# Patient Record
Sex: MALE | Race: BLACK OR AFRICAN AMERICAN | Employment: UNEMPLOYED | URBAN - METROPOLITAN AREA
[De-identification: names, ages, dates, MRNs, and addresses within clinical notes are randomized per-mention and may not be internally consistent; named-entity substitution may affect disease eponyms.]

---

## 2021-06-09 ENCOUNTER — HOSPITAL ENCOUNTER (EMERGENCY)
Age: 21
Discharge: HOME | End: 2021-06-09
Attending: EMERGENCY MEDICINE | Admitting: EMERGENCY MEDICINE
Payer: COMMERCIAL

## 2021-06-09 VITALS
DIASTOLIC BLOOD PRESSURE: 90 MMHG | WEIGHT: 240 LBS | HEART RATE: 98 BPM | SYSTOLIC BLOOD PRESSURE: 149 MMHG | RESPIRATION RATE: 20 BRPM | BODY MASS INDEX: 32.51 KG/M2 | OXYGEN SATURATION: 98 % | TEMPERATURE: 98.6 F | HEIGHT: 72 IN
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
## 2021-06-09 DIAGNOSIS — S39.012A BACK STRAIN, INITIAL ENCOUNTER: Primary | ICD-10-CM

## 2021-06-09 PROCEDURE — 99282 EMERGENCY DEPT VISIT SF MDM: CPT

## 2021-06-09 RX ORDER — METHOCARBAMOL 750 MG/1
750 TABLET, FILM COATED ORAL 4 TIMES DAILY
Qty: 30 TABLET | Refills: 0 | Status: SHIPPED | OUTPATIENT
Start: 2021-06-09 | End: 2021-06-17

## 2021-06-10 NOTE — DISCHARGE INSTRUCTIONS
Follow up with one of the doctors listed. Return to the ER if your symptoms worsen.     [address] [facility] [address]  [phone]    [address] [place] [address] [id number] [phone]
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
## 2021-06-14 ENCOUNTER — APPOINTMENT (OUTPATIENT)
Dept: GENERAL RADIOLOGY | Age: 21
End: 2021-06-14
Attending: EMERGENCY MEDICINE
Payer: COMMERCIAL

## 2021-06-14 ENCOUNTER — HOSPITAL ENCOUNTER (EMERGENCY)
Age: 21
Discharge: HOME | End: 2021-06-14
Attending: EMERGENCY MEDICINE
Payer: COMMERCIAL

## 2021-06-14 VITALS
BODY MASS INDEX: 32.51 KG/M2 | DIASTOLIC BLOOD PRESSURE: 84 MMHG | TEMPERATURE: 99.1 F | HEIGHT: 72 IN | OXYGEN SATURATION: 98 % | WEIGHT: 240 LBS | HEART RATE: 90 BPM | SYSTOLIC BLOOD PRESSURE: 145 MMHG | RESPIRATION RATE: 20 BRPM

## 2021-06-14 DIAGNOSIS — S29.019A THORACIC MYOFASCIAL STRAIN, INITIAL ENCOUNTER: ICD-10-CM
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
## 2021-06-14 DIAGNOSIS — M62.838 MUSCLE SPASM: ICD-10-CM

## 2021-06-14 PROCEDURE — 72070 X-RAY EXAM THORAC SPINE 2VWS: CPT

## 2021-06-14 PROCEDURE — 99283 EMERGENCY DEPT VISIT LOW MDM: CPT

## 2021-06-14 RX ORDER — KETOROLAC TROMETHAMINE 10 MG/1
10 TABLET, FILM COATED ORAL
Qty: 30 TABLET | Refills: 0 | Status: SHIPPED | OUTPATIENT
Start: 2021-06-14
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
## 2021-06-15 NOTE — ED NOTES
I have reviewed discharge instructions with the patient. The patient verbalized understanding. Patient left ED via Discharge Method: ambulatory to Home with self. Opportunity for questions and clarification provided. Patient given 1 scripts. Pt in no acute distress at discharge. Medication explained to pt and pt v\u about medication. To continue your aftercare when you leave the hospital, you may receive an automated call from our care team to check in on how you are doing. This is a free service and part of our promise to provide the best care and service to meet your aftercare needs.  If you have questions, or wish to unsubscribe from this service please call [phone]. Thank you for Choosing our [facility] Emergency Department.

## 2021-11-06 ENCOUNTER — HOSPITAL ENCOUNTER (EMERGENCY)
Age: 21
Discharge: HOME | End: 2021-11-06
Attending: EMERGENCY MEDICINE
Payer: COMMERCIAL

## 2021-11-06 VITALS
RESPIRATION RATE: 20 BRPM | BODY MASS INDEX: 45.1 KG/M2 | WEIGHT: 315 LBS | HEIGHT: 70 IN | OXYGEN SATURATION: 94 % | HEART RATE: 105 BPM | DIASTOLIC BLOOD PRESSURE: 79 MMHG | SYSTOLIC BLOOD PRESSURE: 150 MMHG | TEMPERATURE: 98 F

## 2021-11-06 DIAGNOSIS — T78.2XXA ANAPHYLAXIS, INITIAL ENCOUNTER: Primary | ICD-10-CM

## 2021-11-06 DIAGNOSIS — Z91.018 FOOD ALLERGY: ICD-10-CM

## 2021-11-06 PROCEDURE — 96374 THER/PROPH/DIAG INJ IV PUSH: CPT

## 2021-11-06 PROCEDURE — 74011000250 HC RX REV CODE- 250: Performed by: EMERGENCY MEDICINE

## 2021-11-06 PROCEDURE — 96361 HYDRATE IV INFUSION ADD-ON: CPT

## 2021-11-06 PROCEDURE — 99284 EMERGENCY DEPT VISIT MOD MDM: CPT

## 2021-11-06 PROCEDURE — 96372 THER/PROPH/DIAG INJ SC/IM: CPT

## 2021-11-06 PROCEDURE — 96375 TX/PRO/DX INJ NEW DRUG ADDON: CPT

## 2021-11-06 PROCEDURE — 94640 AIRWAY INHALATION TREATMENT: CPT

## 2021-11-06 PROCEDURE — 74011250636 HC RX REV CODE- 250/636: Performed by: EMERGENCY MEDICINE

## 2021-11-06 RX ORDER — ALBUTEROL SULFATE 0.83 MG/ML
5 SOLUTION RESPIRATORY (INHALATION)
Status: COMPLETED | OUTPATIENT
Start: 2021-11-06 | End: 2021-11-06

## 2021-11-06 RX ORDER — DIPHENHYDRAMINE HYDROCHLORIDE 50 MG/ML
25 INJECTION, SOLUTION INTRAMUSCULAR; INTRAVENOUS
Status: COMPLETED | OUTPATIENT
Start: 2021-11-06 | End: 2021-11-06

## 2021-11-06 RX ORDER — EPINEPHRINE 0.3 MG/.3ML
0.3 INJECTION SUBCUTANEOUS
Qty: 1 EACH | Refills: 1 | Status: SHIPPED | OUTPATIENT
Start: 2021-11-06 | End: 2021-11-06

## 2021-11-06 RX ORDER — PREDNISONE 20 MG/1
40 TABLET ORAL DAILY
Qty: 10 TABLET | Refills: 0 | Status: SHIPPED | OUTPATIENT
Start: 2021-11-06 | End: 2021-11-11

## 2021-11-06 RX ORDER — EPINEPHRINE 1 MG/ML
0.3 INJECTION, SOLUTION, CONCENTRATE INTRAVENOUS ONCE
Status: COMPLETED | OUTPATIENT
Start: 2021-11-06 | End: 2021-11-06

## 2021-11-06 RX ORDER — FAMOTIDINE 20 MG/1
20 TABLET, FILM COATED ORAL 2 TIMES DAILY
Qty: 10 TABLET | Refills: 0 | Status: SHIPPED | OUTPATIENT
Start: 2021-11-06 | End: 2021-11-11

## 2021-11-06 RX ADMIN — SODIUM CHLORIDE 1000 ML: 900 INJECTION, SOLUTION INTRAVENOUS at 19:02

## 2021-11-06 RX ADMIN — DIPHENHYDRAMINE HYDROCHLORIDE 25 MG: 50 INJECTION, SOLUTION INTRAMUSCULAR; INTRAVENOUS at 19:02

## 2021-11-06 RX ADMIN — EPINEPHRINE 0.3 MG: 1 INJECTION, SOLUTION, CONCENTRATE INTRAVENOUS at 19:03

## 2021-11-06 RX ADMIN — FAMOTIDINE 20 MG: 10 INJECTION, SOLUTION INTRAVENOUS at 19:05

## 2021-11-06 RX ADMIN — METHYLPREDNISOLONE SODIUM SUCCINATE 125 MG: 125 INJECTION, POWDER, FOR SOLUTION INTRAMUSCULAR; INTRAVENOUS at 19:05

## 2021-11-06 RX ADMIN — ALBUTEROL SULFATE 5 MG: 2.5 SOLUTION RESPIRATORY (INHALATION) at 19:05

## 2021-11-06 NOTE — ED PROVIDER NOTES
Patient presents the ER with concerns of allergic reaction. States about 30 minutes ago after eating a banana he noticed itching and swelling to his body. Is had increased swelling issue to his face. Does report some shortness of breath. Denies any cough. The history is provided by the patient. Allergic Reaction   This is a new problem. The current episode started less than 1 hour ago. The problem has been gradually worsening. Associated symptoms include shortness of breath. Pertinent negatives include no nausea, no vomiting and no confusion. Past Medical History:   Diagnosis Date    Asthma        No past surgical history on file. No family history on file. Social History     Socioeconomic History    Marital status: SINGLE     Spouse name: Not on file    Number of children: Not on file    Years of education: Not on file    Highest education level: Not on file   Occupational History    Not on file   Tobacco Use    Smoking status: Never Smoker    Smokeless tobacco: Never Used   Substance and Sexual Activity    Alcohol use: Not on file    Drug use: Not on file    Sexual activity: Not on file   Other Topics Concern    Not on file   Social History Narrative    Not on file     Social Determinants of Health     Financial Resource Strain:     Difficulty of Paying Living Expenses: Not on file   Food Insecurity:     Worried About Running Out of Food in the Last Year: Not on file    [place] of Food in the Last Year: Not on file   Transportation Needs:     Lack of Transportation (Medical): Not on file    Lack of Transportation (Non-Medical):  Not on file   Physical Activity:     Days of Exercise per Week: Not on file    Minutes of Exercise per Session: Not on file   Stress:     Feeling of Stress : Not on file   Social Connections:     Frequency of Communication with Friends and Family: Not on file    Frequency of Social Gatherings with Friends and Family: Not on file    Attends Cheondoism Services: Not on file    Active Member of Clubs or Organizations: Not on file    Attends Club or Organization Meetings: Not on file    Marital Status: Not on file   Intimate Partner Violence:     Fear of Current or Ex-Partner: Not on file    Emotionally Abused: Not on file    Physically Abused: Not on file    Sexually Abused: Not on file   Housing Stability:     Unable to Pay for Housing in the Last Year: Not on file    Number of [place] in the Last Year: Not on file    Unstable Housing in the Last Year: Not on file         ALLERGIES: Patient has no known allergies. Review of Systems   Constitutional: Negative for fatigue and fever. HENT: Negative for congestion, dental problem, trouble swallowing and voice change. Eyes: Negative for photophobia and redness. Respiratory: Positive for chest tightness, shortness of breath and wheezing. Negative for apnea. Cardiovascular: Negative for chest pain and leg swelling. Gastrointestinal: Negative for abdominal distention, abdominal pain, nausea and vomiting. Endocrine: Negative for polyphagia and polyuria. Genitourinary: Negative for hematuria. Musculoskeletal: Negative for back pain, gait problem, neck pain and neck stiffness. Skin: Positive for rash. Allergic/Immunologic: Negative for food allergies and immunocompromised state. Neurological: Negative for tremors and weakness. Hematological: Negative for adenopathy. Does not bruise/bleed easily. Psychiatric/Behavioral: Negative for behavioral problems and confusion. All other systems reviewed and are negative. Vitals:    11/06/21 1847   BP: 120/75   Pulse: (!) 105   Resp: 20   Temp: 98 °F (36.7 °C)   SpO2: 99%   Weight: 22.7 kg (50 lb)            Physical Exam  Vitals and nursing note reviewed. Constitutional:       General: He is not in acute distress. Appearance: Normal appearance. He is not ill-appearing. HENT:      Head: Normocephalic and atraumatic. Comments: Facial swelling noted     Right Ear: External ear normal.      Left Ear: External ear normal.      Nose: Nose normal. No congestion or rhinorrhea. Mouth/Throat:      Mouth: Mucous membranes are moist.      Pharynx: No oropharyngeal exudate or posterior oropharyngeal erythema. Eyes:      Extraocular Movements: Extraocular movements intact. Pupils: Pupils are equal, round, and reactive to light. Cardiovascular:      Rate and Rhythm: Normal rate and regular rhythm. Pulses: Normal pulses. Heart sounds: Normal heart sounds. Pulmonary:      Effort: Pulmonary effort is normal. No respiratory distress. Breath sounds: No stridor. Wheezing present. Musculoskeletal:         General: No swelling, tenderness, deformity or signs of injury. Normal range of motion. Cervical back: Normal range of motion and neck supple. Skin:     Findings: Rash present. Comments: Urticarial rash   Neurological:      Mental Status: He is alert. MDM  Number of Diagnoses or Management Options  Diagnosis management comments: Patient has wheezing, facial swelling, urticarial rash. Will treat with epinephrine, steroids, antihistamines. 7:27 PM  Symptomatically patient has improved. His swelling of his face has improved after being treated. We will continue to monitor. 9:08 PM  Patient remains symptomatically improved. Plan to discharge home. Urged outpatient follow-up with neurology testing. Amount and/or Complexity of Data Reviewed  Clinical lab tests: ordered and reviewed    Risk of Complications, Morbidity, and/or Mortality  Presenting problems: high  Diagnostic procedures: high  Management options: high    Patient Progress  Patient progress: stable         Procedures               Results Include:    No results found for this or any previous visit (from the past 24 hour(s)). Voice dictation software was used during the making of this note.   This software is not perfect and grammatical and other typographical errors may be present. This note has been proofread, but may still contain errors. Selam Diaz MD; 11/6/2021 @9:08 PM   ===================================================================    I wore appropriate PPE throughout this patient's ED visit.  Selam Diaz MD, 9:08 PM

## 2021-11-07 NOTE — ED NOTES
I have reviewed discharge instructions with the patient. The patient verbalized understanding. Patient left ED via Discharge Method: ambulatory to Home with (self). Opportunity for questions and clarification provided. Patient given 3 scripts. To continue your aftercare when you leave the hospital, you may receive an automated call from our care team to check in on how you are doing. This is a free service and part of our promise to provide the best care and service to meet your aftercare needs.  If you have questions, or wish to unsubscribe from this service please call [phone]. Thank you for Choosing our [address] [place] [address] Emergency Department.

## 2021-11-07 NOTE — DISCHARGE INSTRUCTIONS
Take medications as prescribed  Avoid eating bananas  As we discussed you likely need outpatient allergy testing  Return to the ER for any new, worsening or life-threatening symptoms

## 2023-01-06 ENCOUNTER — HOSPITAL ENCOUNTER (EMERGENCY)
Age: 23
Discharge: LEFT BEFORE DISCHARGE | End: 2023-01-06

## 2023-01-06 VITALS
OXYGEN SATURATION: 98 % | BODY MASS INDEX: 30.48 KG/M2 | SYSTOLIC BLOOD PRESSURE: 158 MMHG | WEIGHT: 230 LBS | HEART RATE: 67 BPM | HEIGHT: 73 IN | RESPIRATION RATE: 16 BRPM | DIASTOLIC BLOOD PRESSURE: 90 MMHG | TEMPERATURE: 98.6 F

## 2023-05-04 ENCOUNTER — HOSPITAL ENCOUNTER (EMERGENCY)
Age: 23
Discharge: HOME | End: 2023-05-04
Attending: EMERGENCY MEDICINE
Payer: MEDICAID

## 2023-05-04 VITALS
RESPIRATION RATE: 25 BRPM | OXYGEN SATURATION: 97 % | SYSTOLIC BLOOD PRESSURE: 144 MMHG | HEIGHT: 73 IN | DIASTOLIC BLOOD PRESSURE: 89 MMHG | HEART RATE: 90 BPM | WEIGHT: 250 LBS | BODY MASS INDEX: 33.13 KG/M2 | TEMPERATURE: 99 F

## 2023-05-04 DIAGNOSIS — T78.40XA ALLERGIC REACTION, INITIAL ENCOUNTER: Primary | ICD-10-CM

## 2023-05-04 PROCEDURE — 96372 THER/PROPH/DIAG INJ SC/IM: CPT

## 2023-05-04 PROCEDURE — 2500000003 HC RX 250 WO HCPCS

## 2023-05-04 PROCEDURE — A4216 STERILE WATER/SALINE, 10 ML: HCPCS

## 2023-05-04 PROCEDURE — 6360000002 HC RX W HCPCS

## 2023-05-04 PROCEDURE — 96374 THER/PROPH/DIAG INJ IV PUSH: CPT

## 2023-05-04 PROCEDURE — 2580000003 HC RX 258

## 2023-05-04 PROCEDURE — 99284 EMERGENCY DEPT VISIT MOD MDM: CPT

## 2023-05-04 PROCEDURE — 96375 TX/PRO/DX INJ NEW DRUG ADDON: CPT

## 2023-05-04 RX ORDER — DIPHENHYDRAMINE HYDROCHLORIDE 50 MG/ML
50 INJECTION INTRAMUSCULAR; INTRAVENOUS ONCE
Status: COMPLETED | OUTPATIENT
Start: 2023-05-04 | End: 2023-05-04

## 2023-05-04 RX ORDER — EPINEPHRINE 0.3 MG/.3ML
0.3 INJECTION SUBCUTANEOUS ONCE
Qty: 0.3 ML | Refills: 0 | Status: SHIPPED | OUTPATIENT
Start: 2023-05-04 | End: 2023-05-04

## 2023-05-04 RX ORDER — EPINEPHRINE 1 MG/ML(1)
0.3 AMPUL (ML) INJECTION
Status: COMPLETED | OUTPATIENT
Start: 2023-05-04 | End: 2023-05-04

## 2023-05-04 RX ORDER — METHYLPREDNISOLONE SODIUM SUCCINATE 125 MG/2ML
125 INJECTION, POWDER, LYOPHILIZED, FOR SOLUTION INTRAMUSCULAR; INTRAVENOUS
Status: COMPLETED | OUTPATIENT
Start: 2023-05-04 | End: 2023-05-04

## 2023-05-04 RX ADMIN — EPINEPHRINE 0.3 MG: 1 INJECTION, SOLUTION, CONCENTRATE INTRAVENOUS at 01:47

## 2023-05-04 RX ADMIN — FAMOTIDINE 40 MG: 10 INJECTION, SOLUTION INTRAVENOUS at 01:47

## 2023-05-04 RX ADMIN — DIPHENHYDRAMINE HYDROCHLORIDE 50 MG: 50 INJECTION, SOLUTION INTRAMUSCULAR; INTRAVENOUS at 01:45

## 2023-05-04 RX ADMIN — METHYLPREDNISOLONE SODIUM SUCCINATE 125 MG: 125 INJECTION, POWDER, FOR SOLUTION INTRAMUSCULAR; INTRAVENOUS at 01:45

## 2023-05-04 ASSESSMENT — LIFESTYLE VARIABLES
HOW OFTEN DO YOU HAVE A DRINK CONTAINING ALCOHOL: NEVER
HOW MANY STANDARD DRINKS CONTAINING ALCOHOL DO YOU HAVE ON A TYPICAL DAY: PATIENT DOES NOT DRINK

## 2023-05-04 ASSESSMENT — ENCOUNTER SYMPTOMS
SORE THROAT: 1
VOICE CHANGE: 1
FACIAL SWELLING: 1
CHEST TIGHTNESS: 1

## 2023-05-04 ASSESSMENT — PAIN - FUNCTIONAL ASSESSMENT
PAIN_FUNCTIONAL_ASSESSMENT: NONE - DENIES PAIN
PAIN_FUNCTIONAL_ASSESSMENT: NONE - DENIES PAIN

## 2023-05-04 NOTE — ED NOTES
I have reviewed discharge instructions with the patient. The patient and spouse verbalized understanding. Patient left ED via Discharge Method: ambulatory to Home with self. Opportunity for questions and clarification provided. Patient given 1 scripts. To continue your aftercare when you leave the hospital, you may receive an automated call from our care team to check in on how you are doing. This is a free service and part of our promise to provide the best care and service to meet your aftercare needs.  If you have questions, or wish to unsubscribe from this service please call [phone]. Thank you for Choosing our [facility] Emergency Department.           Bunny Orr RN  05/04/23 6525

## 2023-05-04 NOTE — DISCHARGE INSTRUCTIONS
Please fill the prescription for the EpiPen. Keep it with you at all times. If you start to have facial swelling or swelling to your mouth or throat please use the EpiPen and call 911.

## 2023-05-04 NOTE — ED TRIAGE NOTES
Pt ambulatory to triage. States ate a kiwi around 10 pm, states broke out in hives along with facial swelling including eyes. States hard to swallow and \"scratchy\" throat. States no hx of allergic reaction to kiwi but had similar allergic reaction to bananas. MD made aware. Patient moved to room 6.

## 2023-05-04 NOTE — ED PROVIDER NOTES
deficit present. Mental Status: He is alert and oriented to person, place, and time. Psychiatric:         Mood and Affect: Mood normal.         Behavior: Behavior normal.         Thought Content: Thought content normal.         Judgment: Judgment normal.        Procedures     Procedures    No orders of the defined types were placed in this encounter. Medications   EPINEPHrine (EPINEPHrine HCL) 1 mg/mL injection 0.3 mg (0.3 mg IntraMUSCular Given 5/4/23 0147)   famotidine (PEPCID) 40 mg in sodium chloride (PF) 0.9 % 10 mL injection (40 mg IntraVENous Given 5/4/23 0147)   methylPREDNISolone sodium succ (SOLU-MEDROL) injection 125 mg (125 mg IntraVENous Given 5/4/23 0145)   diphenhydrAMINE (BENADRYL) injection 50 mg (50 mg IntraVENous Given 5/4/23 0145)       New Prescriptions    EPINEPHRINE (EPIPEN 2-GIANNA) 0.3 MG/0.3ML SOAJ INJECTION    Inject 0.3 mLs into the muscle once for 1 dose Use as directed for allergic reaction        Past Medical History:   Diagnosis Date    Asthma         No past surgical history on file. Social History     Socioeconomic History    Marital status: Single   Tobacco Use    Smoking status: Never    Smokeless tobacco: Never        Previous Medications    No medications on file        No results found for any visits on 05/04/23. No orders to display                     Voice dictation software was used during the making of this note. This software is not perfect and grammatical and other typographical errors may be present. This note has not been completely proofread for errors.      GONZALO Jolley - ANGEL  05/04/23 8931

## 2023-05-14 RX ORDER — KETOROLAC TROMETHAMINE 10 MG/1
10 TABLET, FILM COATED ORAL EVERY 6 HOURS PRN
COMMUNITY
Start: 2021-06-14